# Patient Record
Sex: MALE | Race: WHITE | NOT HISPANIC OR LATINO | Employment: FULL TIME | URBAN - METROPOLITAN AREA
[De-identification: names, ages, dates, MRNs, and addresses within clinical notes are randomized per-mention and may not be internally consistent; named-entity substitution may affect disease eponyms.]

---

## 2017-03-08 ENCOUNTER — ALLSCRIPTS OFFICE VISIT (OUTPATIENT)
Dept: OTHER | Facility: OTHER | Age: 37
End: 2017-03-08

## 2017-03-08 DIAGNOSIS — R07.89 OTHER CHEST PAIN: ICD-10-CM

## 2017-03-08 DIAGNOSIS — D17.1 BENIGN LIPOMATOUS NEOPLASM OF SKIN AND SUBCUTANEOUS TISSUE OF TRUNK: ICD-10-CM

## 2017-03-08 DIAGNOSIS — R53.83 OTHER FATIGUE: ICD-10-CM

## 2017-03-08 DIAGNOSIS — R06.00 DYSPNEA: ICD-10-CM

## 2017-05-23 ENCOUNTER — HOSPITAL ENCOUNTER (OUTPATIENT)
Dept: RADIOLOGY | Facility: CLINIC | Age: 37
Discharge: HOME | End: 2017-05-23
Payer: COMMERCIAL

## 2017-05-23 DIAGNOSIS — D17.1 BENIGN LIPOMATOUS NEOPLASM OF SKIN AND SUBCUTANEOUS TISSUE OF TRUNK: ICD-10-CM

## 2017-05-23 PROCEDURE — 76705 ECHO EXAM OF ABDOMEN: CPT

## 2017-05-24 ENCOUNTER — GENERIC CONVERSION - ENCOUNTER (OUTPATIENT)
Dept: OTHER | Facility: OTHER | Age: 37
End: 2017-05-24

## 2018-01-11 NOTE — RESULT NOTES
Verified Results  [address] (NON EXTREMITY) [id number] 12:58PM Junior Amos Order Number: [id number]    - Patient Instructions: To schedule this appointment, please contact Central Scheduling at [phone]  Test Name Result Flag Reference   US TRUNK SOFT TISSUE (Report)     Superficial abdominal body wall ULTRASOUND     INDICATION: 46-year-old man presenting for evaluation of a palpable lump in the left anterior lower abdomen  Patient reports this has been present for several years, but is now causing discomfort  COMPARISON: None  TECHNIQUE:  Real-time ultrasound of the left body wall in the area of palpable concern was performed with a linear transducer with both volumetric sweeps and still imaging techniques  FINDINGS: In the left body wall area of palpable concern, an isoechoic circumscribed mass measuring 1 7 x 1 4 x 1 7 cm is compatible with a benign lipoma  No suspicious masses are seen  IMPRESSION:   Left anterior body wall palpable lump has findings indicative of a benign lipoma on ultrasound         Workstation performed: [id number]     Signed by:   Jayme Olmos MD   5/23/17

## 2018-01-16 NOTE — PROGRESS NOTES
Assessment    1  Chest pain, atypical (786 59) (R07 89)   2  Dyspnea, unspecified type (786 09) (R06 00)   3  Fatigue (780 79) (R53 83)   4  Lipoma of torso (214 1) (D17 1)   5  BMI 26 0-26 9,adult (V85 22) (Z68 26)    Plan  Chest pain, atypical    · EKG/ECG- POC; Status:Complete;   Done: [id number] 03:23PM  Chest pain, atypical, Dyspnea, unspecified type, Fatigue, Lipoma of torso    · (1) CBC/PLT/DIFF; Status:Active; Requested for:08Mar2017;    · (1) COMPREHENSIVE METABOLIC PANEL; Status:Active; Requested for:08Mar2017;    · (1) LIPID PANEL FASTING W DIRECT LDL REFLEX; Status:Active; Requested  for:08Mar2017;    · (1) TSH; Status:Active; Requested for:08Mar2017;    · (1) URINALYSIS (will reflex a microscopy if leukocytes, occult blood, protein or nitrites are  not within normal limits); Status:Active; Requested for:08Mar2017;    · Follow-up visit in 1 month Evaluation and Treatment  Follow-up  Status: Complete  Done:  [id number]  Lipoma of torso    · US SUPERFICIAL LUMP (NON EXTREMITY); Status:Hold For - Scheduling; Requested  for:08Mar2017;     Discussion/Summary  Discussion Summary:   Vital and bw stable  EKG done in office  reassurance provided     suspect musculoskeletal pain and recommend ibuprofen as needed      will get u/s for mass but consistent with Lipoma-- discussed benign nature but if bothersome, can refer to surgery for removal       will repeat bw    avoid carcinogens         f/u in 1 month for followup    if chest pain or dyspnea worsen---go to ED  Patient's Capacity to Self-Care: Patient is able to Self-Care  Medication SE Review and Pt Understands Tx: Possible side effects of new medications were reviewed with the patient/guardian today  The treatment plan was reviewed with the patient/guardian   The patient/guardian understands and agrees with the treatment plan   Counseling Documentation With Imm: The patient was counseled regarding diagnostic results, instructions for management, risk factor reductions, prognosis, patient and family education, impressions, risks and benefits of treatment options, importance of compliance with treatment  Chief Complaint  Chief Complaint Free Text Note Form: patient presenting to establish with new PCP and discuss multiple health issues sl/cma      History of Present Illness  HPI: Pt seen and examined  Here to establish care as a new patient    had bw done in June 2016 for life insurance  Had lipid profile, bun, cr, no blood in urine, liver enzymes, a1c--- all wnl  HIV negative       Pt has some cramping and squeezing left side of chest  Started 3 days ago  Feels like a muscle cramp  Having some SOB  comes and goes  pt feels as if he is losing his ability to breathe  doesn't feel anxious  pt does smoke MJ habitually   Pt wants to get active and exercise and wants to make sure he is healthy enough  Pt has a lump on the left side of abdomen  Has had it worked up and was told it was normal  Pt wants it checked again  Pt was told it was a cyst    Has had it for 10 years  Will get occasionally uncomfortable  + fatigue  Has kids at home  Review of Systems  Complete-Male:   Constitutional: feeling tired, but not feeling poorly  Eyes: no eyesight problems  Cardiovascular: as noted in HPI  Respiratory: as noted in HPI  Gastrointestinal: no nausea, no constipation and no diarrhea  Musculoskeletal: arthralgias and myalgias  Integumentary: no rashes  Neurological: as noted in HPI  Psychiatric: as noted in HPI  Past Medical History  Active Problems And Past Medical History Reviewed: The active problems and past medical history were reviewed and updated today  Surgical History    1  History of Appendectomy   2  History of Hand Surgery  Surgical History Reviewed: The surgical history was reviewed and updated today  Family History  Mother    1  Family history of Bipolar disorder  Father    2   Family history of cerebrovascular accident (CVA) (V17 1) (Z82 3)  Family History Reviewed: The family history was reviewed and updated today  Social History    · Daily caffeinated coffee consumption   · Former smoker (U69 45) (M13 668)   · Marijuana   · Social drinker (V49 89) (Z78 9)  Social History Reviewed: The social history was reviewed and updated today  Current Meds   1  No Reported Medications Recorded    Allergies    1  No Known Drug Allergies    Vitals  Vital Signs    Recorded: [id number] 02:45PM   Temperature 97 5 F   Heart Rate 78   Respiration 18   Systolic 488   Diastolic 78   Height 5 ft 9 in   Weight 180 lb 6 4 oz   BMI Calculated 26 64   BSA Calculated 1 98     Physical Exam    Constitutional   General appearance: No acute distress, well appearing and well nourished  Eyes   Conjunctiva and lids: No swelling, erythema, or discharge  Ears, Nose, Mouth, and Throat   External inspection of ears and nose: Normal     Pulmonary   Respiratory effort: No increased work of breathing or signs of respiratory distress  Auscultation of lungs: Clear to auscultation, equal breath sounds bilaterally, no wheezes, no rales, no rhonci  Cardiovascular   Auscultation of heart: Normal rate and rhythm, normal S1 and S2, without murmurs  Examination of extremities for edema and/or varicosities: Normal     Carotid pulses: Normal     Abdomen subq mass LLQ consistent with lipoma  Liver and spleen: No hepatomegaly or splenomegaly  Lymphatic   Palpation of lymph nodes in neck: No lymphadenopathy  Musculoskeletal   Gait and station: Normal     Psychiatric   Orientation to person, place and time: Normal     Mood and affect: Normal          Results/Data  EKG/ECG- POC [id number] 03:23PM Eboni Mendez     Test Name Result Flag Reference   EKG/ECG      rsr in v1 --no acute abnormalities         Future Appointments    Date/Time Provider Specialty Site   04/13/2017 09:45 AM Eboni Mendez DO Family [facility]     Signatures   Electronically signed by : Naseem Huynh DO; Mar 10 2017  9:29AM EST                       (Author)

## 2018-01-22 VITALS
HEART RATE: 78 BPM | WEIGHT: 180.4 LBS | TEMPERATURE: 97.5 F | HEIGHT: 69 IN | BODY MASS INDEX: 26.72 KG/M2 | DIASTOLIC BLOOD PRESSURE: 78 MMHG | RESPIRATION RATE: 18 BRPM | SYSTOLIC BLOOD PRESSURE: 120 MMHG